# Patient Record
(demographics unavailable — no encounter records)

---

## 2025-06-11 NOTE — DATA REVIEWED
[FreeTextEntry1] : 1. history obtained from primary caregiver as independent historian due to patient's developmental age and limited recall  2. peds notes reviewed  3. plastics note reviewed

## 2025-06-11 NOTE — HISTORY OF PRESENT ILLNESS
[No Personal or Family History of Easy Bruising, Bleeding, or Issues with General Anesthesia] : No Personal or Family History of easy bruising, bleeding, or issues with general anesthesia [de-identified] : 5 y/o F here for initial evaluation for chronic cough. Referred by PMD - last year to see ENT but cough improved so was not seen States chronic cough x 2-3 years States cough exacerbated by activity (running inside and out) Reports can cough up to an hour - give sips of water and rest  Dry cough but mucousy with illness. Occasional cough at night No trial of nebulizer or inhalers or steroids Not seen by Pulmonary Unsure if related to allergies. Positional snoring Occasional mouth breathing No pauses, gasps or apnea with sleep Restlessness sleeper and wakes at night No day time fatigue Home schooled Denies enuresis  No sleep study done No trial of saline or nasal steroid sprays.  No history of ear or throat infections No parental concern for hearing or speech FT, uncomplicated pregnancy and delivery, no home oxygen requirement, no NICU stay, passed NBST AU

## 2025-06-11 NOTE — PHYSICAL EXAM
[Exposed Vessel] : left anterior vessel not exposed [Clear to Auscultation] : lungs were clear to auscultation bilaterally [Increased Work of Breathing] : no increased work of breathing with use of accessory muscles and retractions [Wheezing] : no wheezing [Normal Gait and Station] : normal gait and station [Normal muscle strength, symmetry and tone of facial, head and neck musculature] : normal muscle strength, symmetry and tone of facial, head and neck musculature [Normal] : no cervical lymphadenopathy

## 2025-06-11 NOTE — CONSULT LETTER
[Dear  ___] : Dear  [unfilled], [Consult Letter:] : I had the pleasure of evaluating your patient, [unfilled]. [Consult Closing:] : Thank you very much for allowing me to participate in the care of this patient.  If you have any questions, please do not hesitate to contact me. [Sincerely,] : Sincerely, [FreeTextEntry2] : Dr. Mkie Reed [FreeTextEntry3] : Gerard Murphy MD      Pediatric Otolaryngology      [address] [address]      Tel [phone]      Fax [phone]

## 2025-06-25 NOTE — DEVELOPMENTAL MILESTONES
[Is dry day and night] : is dry day and night [Tells a story with a beginning,] : tells a story with a beginning, a middle, and an end [Rides a standard bike] : rides a standard bike [FreeTextEntry1] : EARLY READING SKILLS QUIET, SLOW TO WARM

## 2025-06-25 NOTE — HISTORY OF PRESENT ILLNESS
[Mother] : mother [Grade ___] : Grade [unfilled] [No difficulties with Homework] : No difficulties with homework [Adequate performance] : Adequate performance [Adequate attention] : Adequate attention [Fruit] : fruit [Meat] : meat [Grains] : grains [Eggs] : eggs [Dairy] : dairy [Toilet Trained] : toilet trained [Normal] : Normal [Brushing teeth] : Brushing teeth [Yes] : Patient goes to dentist yearly [Child Cooperates] : Child cooperates [No] : No cigarette smoke exposure [Car seat in back seat] : Car seat in back seat [Up to date] : Up to date [FreeTextEntry7] : FAILED VISION SCREEN LAST YEAR.  REFERRED TO OPHTHO, DID NOT GO.  [de-identified] : SEEN BY ENT FOR CHRONIC COUGH, OFTEN WITH EXERTION.  HAS ENLARGED ADENOIDS, RX FLONASE, F/U NEXT MONTH AND REFERRED TO PULM, APPOINTMENT 7/7.  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

## 2025-06-25 NOTE — HISTORY OF PRESENT ILLNESS
[Mother] : mother [Grade ___] : Grade [unfilled] [No difficulties with Homework] : No difficulties with homework [Adequate performance] : Adequate performance [Adequate attention] : Adequate attention [Fruit] : fruit [Meat] : meat [Grains] : grains [Eggs] : eggs [Dairy] : dairy [Toilet Trained] : toilet trained [Normal] : Normal [Brushing teeth] : Brushing teeth [Yes] : Patient goes to dentist yearly [Child Cooperates] : Child cooperates [No] : No cigarette smoke exposure [Car seat in back seat] : Car seat in back seat [Up to date] : Up to date [FreeTextEntry7] : FAILED VISION SCREEN LAST YEAR.  REFERRED TO OPHTHO, DID NOT GO.  [de-identified] : SEEN BY ENT FOR CHRONIC COUGH, OFTEN WITH EXERTION.  HAS ENLARGED ADENOIDS, RX FLONASE, F/U NEXT MONTH AND REFERRED TO PULM, APPOINTMENT 7/7.  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

## 2025-06-25 NOTE — DISCUSSION/SUMMARY
[Normal Growth] : growth [Normal Development] : development  [No Elimination Concerns] : elimination [No Skin Concerns] : skin [Normal Sleep Pattern] : sleep [None] : no medical problems [School Readiness] : school readiness [Mental Health] : mental health [Nutrition and Physical Activity] : nutrition and physical activity [Oral Health] : oral health [Safety] : safety [Anticipatory Guidance Given] : Anticipatory guidance addressed as per the history of present illness section [No Medications] : ~He/She~ is not on any medications [de-identified] : SUSSY DISCUSSION RE: DIETART MODIFICATIONS [FreeTextEntry1] : Nutritional counseling and suggestions on lifestyle modifications provided.  Refer to Nutrition for further recommendations. Dietary goals will be discussed by myself and the nutritionist in subsequent visits

## 2025-06-25 NOTE — END OF VISIT
[Time Spent: ___ minutes] : I have spent [unfilled] minutes of time on the encounter which excludes teaching and separately reported services. Skin normal color for race, warm, dry and intact. No evidence of rash.

## 2025-06-25 NOTE — PHYSICAL EXAM
[James: ____] : James [unfilled] [James: _____] : James [unfilled] [Alert] : alert [No Acute Distress] : no acute distress [Normocephalic] : normocephalic [Conjunctivae with no discharge] : conjunctivae with no discharge [PERRL] : PERRL [EOMI Bilateral] : EOMI bilateral [Auricles Well Formed] : auricles well formed [Clear Tympanic membranes with present light reflex and bony landmarks] : clear tympanic membranes with present light reflex and bony landmarks [No Discharge] : no discharge [Nares Patent] : nares patent [Pink Nasal Mucosa] : pink nasal mucosa [Palate Intact] : palate intact [Nonerythematous Oropharynx] : nonerythematous oropharynx [Supple, full passive range of motion] : supple, full passive range of motion [No Palpable Masses] : no palpable masses [Symmetric Chest Rise] : symmetric chest rise [Clear to Auscultation Bilaterally] : clear to auscultation bilaterally [Regular Rate and Rhythm] : regular rate and rhythm [Normal S1, S2 present] : normal S1, S2 present [No Murmurs] : no murmurs [+2 Femoral Pulses] : +2 femoral pulses [Soft] : soft [NonTender] : non tender [Non Distended] : non distended [Normoactive Bowel Sounds] : normoactive bowel sounds [No Hepatomegaly] : no hepatomegaly [No Splenomegaly] : no splenomegaly [Patent] : patent [No fissures] : no fissures [No Abnormal Lymph Nodes Palpated] : no abnormal lymph nodes palpated [No Gait Asymmetry] : no gait asymmetry [No pain or deformities with palpation of bone, muscles, joints] : no pain or deformities with palpation of bone, muscles, joints [Normal Muscle Tone] : normal muscle tone [Straight] : straight [No Rash or Lesions] : no rash or lesions [FreeTextEntry1] : VERY QUIET (MOTHER SAYS TALKATIVE AT HOME) [de-identified] : ADIPOSE TISSUE, NO PALPABLE BREAST BUD [FreeTextEntry6] : ERYTHEMA B/L LABIA MAJORA

## 2025-06-25 NOTE — DISCUSSION/SUMMARY
[Normal Growth] : growth [Normal Development] : development  [No Elimination Concerns] : elimination [No Skin Concerns] : skin [Normal Sleep Pattern] : sleep [None] : no medical problems [School Readiness] : school readiness [Mental Health] : mental health [Nutrition and Physical Activity] : nutrition and physical activity [Oral Health] : oral health [Safety] : safety [Anticipatory Guidance Given] : Anticipatory guidance addressed as per the history of present illness section [No Medications] : ~He/She~ is not on any medications [de-identified] : SUSSY DISCUSSION RE: DIETART MODIFICATIONS [FreeTextEntry1] : Nutritional counseling and suggestions on lifestyle modifications provided.  Refer to Nutrition for further recommendations. Dietary goals will be discussed by myself and the nutritionist in subsequent visits

## 2025-06-25 NOTE — PLAN
[TextEntry] : DARON MOHAMUD DIETARY COUNSELING, NUTRITIONIST REFERRAL FASTING LABS TRIAL KETOCONAZOLE TO VULVAR AREA CHILD UNABLE TO VOID IN OFFICE, MOTHER TO BRING BACK CLEAN CATCH FOR U/A AND CULTURE FLU VACCINE IN FALL ANNUAL WELL CARE

## 2025-06-25 NOTE — PHYSICAL EXAM
[James: ____] : James [unfilled] [James: _____] : James [unfilled] [Alert] : alert [No Acute Distress] : no acute distress [Normocephalic] : normocephalic [Conjunctivae with no discharge] : conjunctivae with no discharge [PERRL] : PERRL [EOMI Bilateral] : EOMI bilateral [Auricles Well Formed] : auricles well formed [Clear Tympanic membranes with present light reflex and bony landmarks] : clear tympanic membranes with present light reflex and bony landmarks [No Discharge] : no discharge [Nares Patent] : nares patent [Pink Nasal Mucosa] : pink nasal mucosa [Palate Intact] : palate intact [Nonerythematous Oropharynx] : nonerythematous oropharynx [Supple, full passive range of motion] : supple, full passive range of motion [No Palpable Masses] : no palpable masses [Symmetric Chest Rise] : symmetric chest rise [Clear to Auscultation Bilaterally] : clear to auscultation bilaterally [Regular Rate and Rhythm] : regular rate and rhythm [Normal S1, S2 present] : normal S1, S2 present [No Murmurs] : no murmurs [+2 Femoral Pulses] : +2 femoral pulses [Soft] : soft [NonTender] : non tender [Non Distended] : non distended [Normoactive Bowel Sounds] : normoactive bowel sounds [No Hepatomegaly] : no hepatomegaly [No Splenomegaly] : no splenomegaly [Patent] : patent [No fissures] : no fissures [No Abnormal Lymph Nodes Palpated] : no abnormal lymph nodes palpated [No Gait Asymmetry] : no gait asymmetry [No pain or deformities with palpation of bone, muscles, joints] : no pain or deformities with palpation of bone, muscles, joints [Normal Muscle Tone] : normal muscle tone [Straight] : straight [No Rash or Lesions] : no rash or lesions [FreeTextEntry1] : VERY QUIET (MOTHER SAYS TALKATIVE AT HOME) [de-identified] : ADIPOSE TISSUE, NO PALPABLE BREAST BUD [FreeTextEntry6] : ERYTHEMA B/L LABIA MAJORA

## 2025-07-07 NOTE — PHYSICAL EXAM
[Well Nourished] : well nourished [Well Developed] : well developed [Alert] : ~L alert [Active] : active [Normal Breathing Pattern] : normal breathing pattern [No Respiratory Distress] : no respiratory distress [No Allergic Shiners] : no allergic shiners [No Drainage] : no drainage [No Conjunctivitis] : no conjunctivitis [No Sinus Tenderness] : no sinus tenderness [No Oral Pallor] : no oral pallor [No Oral Cyanosis] : no oral cyanosis [Non-Erythematous] : non-erythematous [No Exudates] : no exudates [No Stridor] : no stridor [Absence Of Retractions] : absence of retractions [Symmetric] : symmetric [Good Expansion] : good expansion [No Acc Muscle Use] : no accessory muscle use [Good aeration to bases] : good aeration to bases [Equal Breath Sounds] : equal breath sounds bilaterally [No Crackles] : no crackles [No Rhonchi] : no rhonchi [No Wheezing] : no wheezing [Normal Sinus Rhythm] : normal sinus rhythm [No Heart Murmur] : no heart murmur [Soft, Non-Tender] : soft, non-tender [No Hepatosplenomegaly] : no hepatosplenomegaly [Non Distended] : was not ~L distended [Abdomen Mass (___ Cm)] : no abdominal mass palpated [Full ROM] : full range of motion [No Clubbing] : no clubbing [Capillary Refill < 2 secs] : capillary refill less than two seconds [No Cyanosis] : no cyanosis [No Contractures] : no contractures [Abnormal Walk] : normal gait [Alert and  Oriented] : alert and oriented [No Abnormal Focal Findings] : no abnormal focal findings [No Birth Marks] : no birth marks [No Rashes] : no rashes [No Skin Lesions] : no skin lesions [FreeTextEntry3] : normal external exam  [FreeTextEntry4] : +congested nasal mucosa

## 2025-07-07 NOTE — REASON FOR VISIT
[Initial Evaluation] : an initial evaluation of [Asthma/RAD] : asthma/RAD [Exercise Induced Dyspnea] : exercise induced dyspnea [Mother] : mother [Medical Records] : medical records

## 2025-07-07 NOTE — HISTORY OF PRESENT ILLNESS
[FreeTextEntry1] : AUSTIN is a 6 year old F who presents to clinic today for initial evaluation of recurrent cough and exercise-induced asthma.  API: paternal history of asthma    INITIAL VISIT: Visit Date: 07/07/2025 - ENT eval in June for recurrent cough, exacerbated by exercise. Positional snoring and occasional mouth breathing. Adenoids 80% obstructive on exam. Recs Flonase x6 weeks and pulm eval  - Mom reports improvement in snoring and mouth breathing after starting Flonase  - History of exercise-induced cough this past year, will experience recurrent cough after strenuous exercise that will last 1-2 hours. Symptoms more present in the Winter and Spring months - No recurrent cough at rest, no night time cough over the last month  - Sick more frequently than older brother in the Winter months, cough will linger a few weeks. No prior albuterol use    Age of Onset of Symptoms: 6 years  PMH: Adenoid hypertrophy, exercise-induced cough  PSH: denies  Meds: denies  Birth Hx: FT, no complications  PCP/Specialists: Dr. Reed    Cough Hx: Triggers: exercise  Allergies: denies  Hx of wheezing: denies  GINGER Use: none  Use of oral steroids: denies  ED/Hospitalizations: denies  Daytime cough: denies  Nighttime cough: denies  Respiratory symptoms with exercise: yes  Chest x-ray: denies    Family hx: Mom- healthy Dad- asthma Brother, 10 years (Nilesh)- 32 weeks gestation, no medical concerns  Family hx of asthma: denies  Family hx of cystic fibrosis, autoimmune disease, recurrent respiratory infections: denies  Feeding issues, TRACEY: denies  JOSUE Sx, Snoring/Gasping/Pauses: snoring, improved with Flonase. No witnessed apneas  Hx of Eczema: denies  Hx of rhinitis, post nasal drip: yes Hx of recurrent infections (ie: pneumonia, AOM, sinusitis): denies  Seen by pulmonologist before: denies    Vaccines UTD: yes  Influenza Vaccine: denies  COVID-19 Vaccine: denies  H/o COVID19/RSV infection: denies    Modified Asthma Predictive Index (mAPI): 4 wheezing illnesses AND 1 major criteria Parental asthma: YES atopic dermatitis: NO Aeroallergen sensitization suspected: YES   OR   2 minor criteria Food sensitization: NO Peripheral blood eosinophilia = % Wheezing apart from colds: NO

## 2025-07-07 NOTE — CONSULT LETTER
[Dear  ___] : Dear  [unfilled], [Courtesy Letter:] : I had the pleasure of seeing your patient, [unfilled], in my office today. [Please see my note below.] : Please see my note below. [Consult Closing:] : Thank you very much for allowing me to participate in the care of this patient.  If you have any questions, please do not hesitate to contact me. [Sincerely,] : Sincerely, [FreeTextEntry3] : Kaci Davis NP

## 2025-07-07 NOTE — REVIEW OF SYSTEMS
[Snoring] : snoring [Rhinorrhea] : rhinorrhea [Nasal Congestion] : nasal congestion [Postnasl Drip] : postnasal drip [Cough] : cough [Shortness of Breath] : shortness of breath [Eczema] : eczema [Poor Appetite] : no poor appetite [Frequent URIs] : no frequent upper respiratory infections [Apnea] : no apnea [Sinus Problems] : no sinus problems [Recurrent Ear Infections] : no recurrent ear infections [Heart Disease] : no heart disease [Wheezing] : no wheezing [Pneumonia] : no pneumonia [Problems Swallowing] : no problems swallowing [Reflux] : no reflux [Failure To Thrive] : no failure to thrive

## 2025-07-07 NOTE — ASSESSMENT
[FreeTextEntry1] : AUSTIN BOOKER is a 6 year girl with history of chronic rhinitis, adenoid hypertrophy and snoring who has experienced recurrent cough and shortness of breath with exercise this last year. Born FT without complication. No prior hospitalizations, ED visits or OCS bursts for respiratory difficulties. Asthma predictive index: father with history of asthma, personal history of suspected aeroallergen sensitization increases Her risk for asthma.   Discussed with parents that asthma is a clinical diagnosis based on risk factors, symptoms and response to medications. Symptoms of chronic cough and recurrent wheeze with a response to bronchodilators are consistent with mild intermittent asthma. Most of the child's exacerbations are probably triggered by exercise. No prior albuterol use before today. Normal spirometry with LLN FEV1/FVC and scooping on exp limb of flow volume curve, significant bronchodilator response in the small airways. Given her symptoms present mainly with exercise and have not recurred at rest over the last month, no current indication for daily ICS at this time. I recommend albuterol PRN prior to exercise and as needed for cough, wheeze or shortness of breath. Safety and efficacy of anti-inflammatory medications and bronchodilators were reviewed with the parents. Demonstrated proper inhaler and spacer technique today.   Discussed asthma, seasonality, and exacerbation with specific triggers including cold weather, allergens, exercise, viral illnesses. Educated on proper MDI/spacer technique and importance of medication compliance. Encouraged avoidance of tobacco smoke exposure and educated on its harmful effects in children with asthma. Discussed signs of respiratory distress and when to seek medical attention.   The patient has clinical findings consistent with rhinitis and an intranasal steroid and/or an anti-histamine with Zyrtec/Claritin and Flonase may be helpful in controlling symptoms associated with a post-nasal drip and upper airway cough syndrome. Consider an allergy evaluation to help determine Her allergic triggers, phone number provided today. Aggressive control of airway inflammation secondary to allergies would help achieve optimal asthma control.   No confounding issues such as TRACEY or JOSUE at this time. Continue Flonase per ENT, instructed mother to monitor for snoring recurrence.    No history of recurrent respiratory infections making immune deficiency, cystic fibrosis and primary ciliary dyskinesia less likely at this time.   Discussed recommendations for flu and COVID 19 vaccines. Safety, side effects and efficacy reviewed.   Parents received plans well. Needs nebulizer for home, will place order today.   Follow up in 2-3 months.  Plan: - Albuterol PRN and prior to exercise - Flonase per ENT - Follow up 2-3 months

## 2025-07-07 NOTE — IMPRESSION
[Spirometry] : Spirometry [Normal Spirometry] : spirometry normal [Reversible] :  with reversibility. [FreeTextEntry1] : Normal spirometry with LLN FEV1/FVC and scooping on exp limb of flow volume curve, significant bronchodilator response in the small airways

## 2025-07-07 NOTE — SOCIAL HISTORY
[Mother] : mother [Father] : father [Brother] : brother [Grade:  _____] : Grade: [unfilled] [Dog] : dog [Smokers in Household] : there are smokers in the home [Bedroom] : not in the bedroom [Basement] : not in the basement [Living Area] : not in the living area [de-identified] : pet dog at home, no symptoms around the home [de-identified] : dad smokes outside the home

## 2025-07-07 NOTE — SOCIAL HISTORY
[Mother] : mother [Father] : father [Brother] : brother [Grade:  _____] : Grade: [unfilled] [Dog] : dog [Smokers in Household] : there are smokers in the home [Bedroom] : not in the bedroom [Basement] : not in the basement [Living Area] : not in the living area [de-identified] : pet dog at home, no symptoms around the home [de-identified] : dad smokes outside the home

## 2025-07-07 NOTE — SOCIAL HISTORY
[Mother] : mother [Father] : father [Brother] : brother [Grade:  _____] : Grade: [unfilled] [Dog] : dog [Smokers in Household] : there are smokers in the home [Bedroom] : not in the bedroom [Basement] : not in the basement [Living Area] : not in the living area [de-identified] : pet dog at home, no symptoms around the home [de-identified] : dad smokes outside the home

## 2025-07-07 NOTE — HISTORY OF PRESENT ILLNESS
Medication is being filled for 1 time refill only due to:  Patient needs to be seen because it has been more than one year since last visit.     MA/TC: please help pt schedule appt.    Sada Robins RN, BSN  [facility]       [FreeTextEntry1] : AUSTIN is a 6 year old F who presents to clinic today for initial evaluation of recurrent cough and exercise-induced asthma.  API: paternal history of asthma    INITIAL VISIT: Visit Date: 07/07/2025 - ENT eval in June for recurrent cough, exacerbated by exercise. Positional snoring and occasional mouth breathing. Adenoids 80% obstructive on exam. Recs Flonase x6 weeks and pulm eval  - Mom reports improvement in snoring and mouth breathing after starting Flonase  - History of exercise-induced cough this past year, will experience recurrent cough after strenuous exercise that will last 1-2 hours. Symptoms more present in the Winter and Spring months - No recurrent cough at rest, no night time cough over the last month  - Sick more frequently than older brother in the Winter months, cough will linger a few weeks. No prior albuterol use    Age of Onset of Symptoms: 6 years  PMH: Adenoid hypertrophy, exercise-induced cough  PSH: denies  Meds: denies  Birth Hx: FT, no complications  PCP/Specialists: Dr. Reed    Cough Hx: Triggers: exercise  Allergies: denies  Hx of wheezing: denies  GINGER Use: none  Use of oral steroids: denies  ED/Hospitalizations: denies  Daytime cough: denies  Nighttime cough: denies  Respiratory symptoms with exercise: yes  Chest x-ray: denies    Family hx: Mom- healthy Dad- asthma Brother, 10 years (Nilesh)- 32 weeks gestation, no medical concerns  Family hx of asthma: denies  Family hx of cystic fibrosis, autoimmune disease, recurrent respiratory infections: denies  Feeding issues, TRACEY: denies  JOSUE Sx, Snoring/Gasping/Pauses: snoring, improved with Flonase. No witnessed apneas  Hx of Eczema: denies  Hx of rhinitis, post nasal drip: yes Hx of recurrent infections (ie: pneumonia, AOM, sinusitis): denies  Seen by pulmonologist before: denies    Vaccines UTD: yes  Influenza Vaccine: denies  COVID-19 Vaccine: denies  H/o COVID19/RSV infection: denies    Modified Asthma Predictive Index (mAPI): 4 wheezing illnesses AND 1 major criteria Parental asthma: YES atopic dermatitis: NO Aeroallergen sensitization suspected: YES   OR   2 minor criteria Food sensitization: NO Peripheral blood eosinophilia = % Wheezing apart from colds: NO

## 2025-07-22 NOTE — HISTORY OF PRESENT ILLNESS
[No Personal or Family History of Easy Bruising, Bleeding, or Issues with General Anesthesia] : No Personal or Family History of easy bruising, bleeding, or issues with general anesthesia [de-identified] : 6 year old female presents for follow up for chronic cough Mother states that she feels the cough has slightly improved.  Uses flonase when she remembers Saw pulm on 7/7/25 who recommended to use albuterol prn No recent fevers or infections

## 2025-07-22 NOTE — DATA REVIEWED
[FreeTextEntry1] : 1. . history obtained from primary caregiver as independent historian due to patient's developmental age and limited recall 2. pulm note reviewed 3. peds noted reviewed